# Patient Record
Sex: MALE
[De-identification: names, ages, dates, MRNs, and addresses within clinical notes are randomized per-mention and may not be internally consistent; named-entity substitution may affect disease eponyms.]

---

## 2021-09-20 ENCOUNTER — HOSPITAL ENCOUNTER (INPATIENT)
Dept: HOSPITAL 53 - M ED | Age: 21
LOS: 3 days | Discharge: HOME | DRG: 885 | End: 2021-09-23
Attending: PSYCHIATRY & NEUROLOGY | Admitting: PSYCHIATRY & NEUROLOGY
Payer: COMMERCIAL

## 2021-09-20 VITALS — SYSTOLIC BLOOD PRESSURE: 126 MMHG | DIASTOLIC BLOOD PRESSURE: 92 MMHG

## 2021-09-20 VITALS — WEIGHT: 197.31 LBS | BODY MASS INDEX: 29.9 KG/M2 | HEIGHT: 68 IN

## 2021-09-20 DIAGNOSIS — F32.1: Primary | ICD-10-CM

## 2021-09-20 DIAGNOSIS — Z56.6: ICD-10-CM

## 2021-09-20 DIAGNOSIS — Z20.822: ICD-10-CM

## 2021-09-20 DIAGNOSIS — R45.851: ICD-10-CM

## 2021-09-20 LAB
ALBUMIN SERPL BCG-MCNC: 3.8 GM/DL (ref 3.2–5.2)
ALT SERPL W P-5'-P-CCNC: 27 U/L (ref 12–78)
AMPHETAMINES UR QL SCN: NEGATIVE
APAP SERPL-MCNC: < 2 UG/ML (ref 10–30)
BARBITURATES UR QL SCN: NEGATIVE
BENZODIAZ UR QL SCN: NEGATIVE
BILIRUB CONJ SERPL-MCNC: < 0.1 MG/DL (ref 0–0.2)
BILIRUB SERPL-MCNC: 0.3 MG/DL (ref 0.2–1)
BUN SERPL-MCNC: 17 MG/DL (ref 7–18)
BZE UR QL SCN: NEGATIVE
CALCIUM SERPL-MCNC: 9 MG/DL (ref 8.5–10.1)
CANNABINOIDS UR QL SCN: NEGATIVE
CHLORIDE SERPL-SCNC: 105 MEQ/L (ref 98–107)
CO2 SERPL-SCNC: 30 MEQ/L (ref 21–32)
CREAT SERPL-MCNC: 0.99 MG/DL (ref 0.7–1.3)
ETHANOL SERPL-MCNC: < 0.003 % (ref 0–0.01)
GFR SERPL CREATININE-BSD FRML MDRD: > 60 ML/MIN/{1.73_M2} (ref 60–?)
GLUCOSE SERPL-MCNC: 99 MG/DL (ref 70–100)
HCT VFR BLD AUTO: 46.7 % (ref 42–52)
HGB BLD-MCNC: 15.7 G/DL (ref 13.5–17.5)
MCH RBC QN AUTO: 29 PG (ref 27–33)
MCHC RBC AUTO-ENTMCNC: 33.6 G/DL (ref 32–36.5)
MCV RBC AUTO: 86.2 FL (ref 80–96)
METHADONE UR QL SCN: NEGATIVE
OPIATES UR QL SCN: NEGATIVE
PCP UR QL SCN: NEGATIVE
PLATELET # BLD AUTO: 259 10^3/UL (ref 150–450)
POTASSIUM SERPL-SCNC: 4.5 MEQ/L (ref 3.5–5.1)
PROT SERPL-MCNC: 7.4 GM/DL (ref 6.4–8.2)
RBC # BLD AUTO: 5.42 10^6/UL (ref 4.3–6.1)
RSV RNA NPH QL NAA+PROBE: NEGATIVE
SALICYLATES SERPL-MCNC: < 1.7 MG/DL (ref 5–30)
SODIUM SERPL-SCNC: 140 MEQ/L (ref 136–145)
TSH SERPL DL<=0.005 MIU/L-ACNC: 1.89 UIU/ML (ref 0.36–3.74)
WBC # BLD AUTO: 4.8 10^3/UL (ref 4–10)

## 2021-09-20 SDOH — HEALTH STABILITY - MENTAL HEALTH: OTHER PHYSICAL AND MENTAL STRAIN RELATED TO WORK: Z56.6

## 2021-09-21 VITALS — SYSTOLIC BLOOD PRESSURE: 121 MMHG | DIASTOLIC BLOOD PRESSURE: 66 MMHG

## 2021-09-21 RX ADMIN — SERTRALINE HYDROCHLORIDE SCH MG: 50 TABLET ORAL at 09:00

## 2021-09-21 RX ADMIN — NICOTINE SCH PATCH: 21 PATCH, EXTENDED RELEASE TRANSDERMAL at 09:00

## 2021-09-21 NOTE — MHIPNPDOC
[facility] Progress Note


Progress Note


DATE OF SERVICE: 9/21/21





This writer decided to admit the patient to [facility] after the case was presented by

Emergency Department staff member. 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 

to overdose and symptoms of depression. Needed to be admitted for safety.





Vital Signs





Vital Signs








  Date Time  Temp Pulse Resp B/P (MAP) Pulse Ox O2 Delivery O2 Flow Rate FiO2


 


9/20/21 18:43 97.9 82 16 126/92 (103)    


 


9/20/21 07:13     98 Room Air  











Current Medications





Current Medications








 Medications


  (Trade)  Dose


 Ordered  Sig/Maximiliano


 Route


 PRN Reason  Start Time


 Stop Time Status Last Admin


Dose Admin


 


 Acetaminophen


  (Tylenol Tab)  650 mg  Q6HP  PRN


 PO


 HEADACHE or MILD DISCOMFORT  9/20/21 18:15


     





 


 Al Hydrox/Mg


 Hydrox/Simethicone


  (Mylanta)  30 ml  Q4HP  PRN


 PO


 HEARTBURN/INDIGESTION  9/20/21 18:15


     





 


 Home Med


  (Home Med List


 Complete!)    ASDIRECTED


 XX


   9/20/21 16:20


 9/20/21 16:18 DC  





 


 Magnesium


 Hydroxide


  (Milk Of


 Magnesia)  30 ml  DAILYPRN  PRN


 PO


 CONSTIPATION  9/20/21 18:15


     





 


 Nicotine


  (Nicoderm Cq


 21mg)  1 patch  DAILY


 TD


   9/21/21 09:00


     





 


 Sertraline HCl


  (Zoloft)  50 mg  DAILY


 PO


   9/21/21 09:00


     





 


 Trazodone HCl


  (Desyrel)  50 mg  QHSP  PRN


 PO


 INSOMNIA  9/20/21 18:15


     














Allergies


Coded Allergies:  


     No Known Allergies (Unverified , 9/20/21)











DIANE DOOLEY MD             Sep 21, 2021 15:02

## 2021-09-21 NOTE — MHHPEPDOC
General


Date Of Admission:  Sep 20, 2021


Legal Status:  9.39


Chief Complaint


"I haven't been the best mentally and emotionally."





History of Present Illness


HISTORY OF THE PRESENT ILLNESS: Patient interview - Patient is a 21 -year-old 

Single, Active Duty  , male, who reports that he has not been 

"mentally and emotionally well" since February.  States that the [employer] has been a

toxic environment for him.  Reports that over the past few months he has had 

negative thinking, doubting himself and feeling like he is in a job where he is 

not cared about.  He feels that there are limited or no opportunities to move 

out of the stressful job.  And many "other life stresses are getting to me."  

States that he had vague suicidal thoughts popping up earlier in the year but 

last weekend had a plan to take pain medications or overdose on pills. "I 

thought I could handle things, but not really, the environment is toxic and not 

healthy." 





PER ED REPORT:  





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 He is from [place] and 

states he has supportive family there. PT has not had to leave the country and 

he is scheduled to ETS 2023. PT states he wants to complete his contract but he 

began to question his purpose "the last few months of last year". PT turned to 

his [employer] and he found them to be supportive and he felt better for a few months. 

PT states his current work environment is an issue as the leadership has been 

making things more difficult then they need to be and that he is not the only 

person to feels this way. PT has difficulty discussing this as he does not wish 

to speak poorly in regards to his supervisor. PT feels that he has no purpose an

d he feels empty and alone. He first experienced SI at the beginning of the year

and has not told anyone to include his fianc of six months. He went to [place] for a few months and felt much better until her returned August 6th. PT 

feels he should be more established with his life as he had a good paying job at

a warehouse prior to enlisting and now he wonders if he "screwed everything up".

PT's mother struggles with anxiety and she has his two younger siblings to care 

for and is GF also has emotional issues. PT feels that had he made other choices

he would be able to take care of all his loved ones' issues. "I can't believe I 

let things get to this point" PT feels that he should be able to control his 

mental health and is very disappointed with himself. Today he and his coworkers 

were sitting around waiting for their next tasks and everyone was venting about 

stressors. PT states "I snapped" and he admits to being agitated and that he 

spoke of everything he has been struggling with and that "This place makes me 

suicidal". [employer] took PT aside to talk and he continued to be honest about his 

situation. He states he has never told anyone that he was suicidal and that he 

felt he could handle his situation but the thoughts are coming daily and vivid. 

PT states that he has been making jokes in regards to his emotional well-being 

for a very long time and that he wished someone would have realized that he was 

actually struggling "I dont know how to figure this out" meaning mental health.

He has scheduled with the [facility] but has not had time to know if treatment will be

beneficial for him. PT states he has planned out which pills to take and that he

would overdose in his room. PT does not wish to be admitted as it will be a 

reminder to him that he could not handle things on his own.





Psychiatric Review of Systems


Depression (2 or more weeks):  depressed mood (started in February), anhedonia, 

insomnia/hypersomnia (poor sleep, intermittent waking ), decreased energy, 

difficulty concentrating (poor focus), appetite changes, suicidal thoughts, 

other (feelings of hopeless ness)


Susan (4 or more days of):  denies


Psychosis:  paranoia (mild)


PTSD:  denies


Anxiety:  stressor related anxiety





Past Psychiatric History


Previous Psychiatric Diagnosis: possible ADHD as a child


Previous Psychiatric Admissions: This is the first hospitalization


Suicide Attempts: Had a plan to take pain killers last weekend


Psychiatric Follow-up: [facility]


Psychiatric medications: None





Past Medical History


Medical Problems


no contributory medical conditions


Head Injury:  No


Seizures:  No


Hospitalizations:  Yes


Surgeries:  Yes (Hernia at 6 years old)





Family Medical/Psychiatric HX


Medical Problems


Mom- HTN, Anxiety


Maternal Grandfather - Diabetes Insulin Dependent


Psychiatric Disorders:  Yes (mother has anxiety)


Addiction:  Yes (Maternal Uncle - Alcohol, Drugs and Gambling)


Suicide Attemps/Completions:  No





Addiction History


alcohol (occasional )





Social History


Childhood: Born [place] and describes his childhood "ok"  Did well in 

school.  Has many siblings -  Lived with his mother (2 brothers and two sister 

while growing up) but has sibling on his father side


Abuse/Trauma: None


Current Living Situation: Lives in the [facility] on post


Education: High School Diploma


Employment: Active Duty


Social Support: Girlfriend, Mother


Legal: None


Marital: Single





Stressors


1. My self - any little thing morphed and it is not anything that can be fixed


2. My work environment, it is toxic





Mental Status Examination


General Appearance:  well groomed, appears stated age, hospital scubs/clothing


Build:  overweight


Demeanor:  average


Eye Contact:  average


Activity:  average


Behavior:  cooperative


Speech:  clear, normal volume


Mood:  depressed, anxious


Affect:  constricted


Thought Process:  logical/linear


Thought Content (Delusions):  none reported


Thought Content (Other):  none reported


Thought Content (Aggressive):  none reported


Perception (Hallucinations):  none reported


Perception (Other):  none reported


Cognition (Impairment of):  none reported


Cognition(Intelligence Est.):  average


Oriented:  Awake, Alert, Oriented times three


Insight:  good


Judgment:  Good


Psychosis:  Denies





Diagnoses


Major Depressive Disorder, Single Episode, Moderate





A-FIB/CHADSVASC


A-FIB History


Current/History of A-Fib/PAF?:  No


Current PO Anticoag Therapy:  No





Assessment


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  

, male, who reports that he has not been "mentally and emotionally well"

since February.  States that the [employer] has been a toxic environment for him.  

Reports that over the past few months he has had negative thinking, doubting 

himself and feeling like he is in a job where he is not cared about.  Patient 

reports a history of catastrophizing any decisions especially ones where the 

outcomes is poor.  Educated on medications and states "I am just at a low point,

I don't agree on any medications."  Patient is agreeable to cognitive therapy in

individual and group therapy, will provider safe milieu and observe for any 

continued threats of self-harm.  Will discharged when stable.





Initial Treatment Plan


1. Patient was admitted on a [9.39] status.


2. Complete history was obtained.


3. With patients permission, family will be contacted and database will be 

expanded. 


4. Patients medication regimen will be reviewed and changed accordingly. 


5. Patient will be provided with protected environment. 


6. Patient will be treated with individual, group, and milieu therapies. 


7. Patient will receive supportive psych-education.


8. Discharge planning will commence immediately.


9. Outpatient follow-up treatment will be strongly recommended.


10. The initial treatment plan will focus initially on:


* Depression.


* Risk for suicide


* Negative thinking





ESTIMATED LENGTH OF STAY: 3-5 DAYS.





TIME SPENT COUNSELING AND COORDINATING INITIAL CARE: 60minutes.





Tobacco Cessation Screen


If Patient is a Smoker


Patient is not a smoker


Tobacco Cessation Tx Ordered?:  No r/t failed trials


N/A-No Antipsychotics





Vital Signs





Vital Signs








  Date Time  Temp Pulse Resp B/P (MAP) Pulse Ox O2 Delivery O2 Flow Rate FiO2


 


9/20/21 18:43 97.9 82 16 126/92 (103)    


 


9/20/21 07:13     98 Room Air  











Laboratory Data


[facility]


Laboratory Tests 2


9/20/21 14:44: 


Coronavirus (COVID-19)(PCR) NEGATIVE, Influenza Type A (RT-PCR) NEGATIVE, 

Influenza Type B (RT-PCR) NEGATIVE, Respiratory Syncytial Virus (PCR) NEGATIVE





Medications


No Active Prescriptions or Reported Meds





Allergies


Coded Allergies:  


     No Known Allergies (Unverified , 9/20/21)











MARY LEE NP              Sep 21, 2021 11:30

## 2021-09-22 VITALS — DIASTOLIC BLOOD PRESSURE: 60 MMHG | SYSTOLIC BLOOD PRESSURE: 124 MMHG

## 2021-09-22 VITALS — DIASTOLIC BLOOD PRESSURE: 59 MMHG | SYSTOLIC BLOOD PRESSURE: 119 MMHG

## 2021-09-22 RX ADMIN — NICOTINE SCH PATCH: 21 PATCH, EXTENDED RELEASE TRANSDERMAL at 08:33

## 2021-09-22 RX ADMIN — SERTRALINE HYDROCHLORIDE SCH MG: 50 TABLET ORAL at 08:33

## 2021-09-22 NOTE — MHIPNPDOC
[facility] Progress Note


Progress Note


DATE OF SERVICE: 9/22/21





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 

 , male, who reports that he has not been "mentally and 

emotionally well" since February.  States that the [employer] has been a toxic 

environment for him.  Reports that over the past few months he has had negative 

thinking, doubting himself and feeling like he is in a job where he is not cared

about.  He feels that there are limited or no opportunities to move out of the 

stressful job.  And many "other life stresses are getting to me."  States that 

he had vague suicidal thoughts popping up earlier in the year but last weekend 

had a plan to take pain medications or overdose on pills. "I thought I could 

handle things, but not really, the environment is toxic and not healthy." 





PER ED REPORT:  





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 He is from [place] and 

states he has supportive family there. PT has not had to leave the country and 

he is scheduled to ETS 2023. PT states he wants to complete his contract but he 

began to question his purpose "the last few months of last year". PT turned to 

his [employer] and he found them to be supportive and he felt better for a few months. 

PT states his current work environment is an issue as the leadership has been 

making things more difficult then they need to be and that he is not the only 

person to feels this way. PT has difficulty discussing this as he does not wish 

to speak poorly in regards to his supervisor. PT feels that he has no purpose 

and he feels empty and alone. He first experienced SI at the beginning of the 

year and has not told anyone to include his fianc of six months. He went to 

[place] for a few months and felt much better until her returned August 6th. 

PT feels he should be more established with his life as he had a good paying job

at a warehouse prior to enlisting and now he wonders if he "screwed everything 

up". PT's mother struggles with anxiety and she has his two younger siblings to 

care for and is GF also has emotional issues. PT feels that had he made other 

choices he would be able to take care of all his loved ones' issues. "I can't 

believe I let things get to this point" PT feels that he should be able to 

control his mental health and is very disappointed with himself. Today he and 

his coworkers were sitting around waiting for their next tasks and everyone was 

venting about stressors. PT states "I snapped" and he admits to being agitated 

and that he spoke of everything he has been struggling with and that "This place

makes me suicidal". [employer] took PT aside to talk and he continued to be honest 

about his situation. He states he has never told anyone that he was suicidal and

that he felt he could handle his situation but the thoughts are coming daily and

vivid. PT states that he has been making jokes in regards to his emotional well-

being for a very long time and that he wished someone would have realized that 

he was actually struggling "I dont know how to figure this out" meaning mental 

health. He has scheduled with the [facility] but has not had time to know if treatment

will be beneficial for him. PT states he has planned out which pills to take and

that he would overdose in his room. PT does not wish to be admitted as it will 

be a reminder to him that he could not handle things on his own.





VITAL SIGNS: See below.





NEW TEST RESULTS: None





CURRENT MEDICATIONS: See below.





MENTAL STATUS EXAMINATION:


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 

reports that he has not been "mentally and emotionally well" 


General Appearance: Hygiene and grooming fair mildly disheveled, appears stated 

age, hospital scrubs/clothing


Build:  overweight


Demeanor:  average


Eye Contact:  average


Activity:  average


Behavior:  cooperative


Speech:  clear, normal volume


Mood:  depressed, anxious


Affect:  constricted


Thought Process:  logical/linear


Thought Content (Delusions):  none reported


Thought Content (Other):  none reported


Thought Content (Aggressive):  none reported


Perception (Hallucinations):  none reported


Perception (Other):  none reported


Cognition (Impairment of):  none reported


Cognition(Intelligence Est.):  average


Oriented:  Awake, Alert, Oriented times three


Insight:  good


Judgment:  Good


Psychosis:  Denies





DIAGNOSES:


Major Depressive Disorder, Single Episode, Moderate





ASSESSMENT: Patient found sleeping willing to be interviewed this morning.  

States that he is anxious this morning but mostly because he does not want to be

on the unit anymore.  Reports that his depression is decreased nominally.  

Reports his mood is "better".  Reports that much of his problems will not be 

taking care of until he is no longer in the [employer].  He refused to elaborate on 

this but we did have a meaningful conversation about some of his stressors.  He 

reports that he understands that much of his feeling about toxic environments is

his ability to view it as a negative thinking.  He also talks about his gir

lfriend having family issues, we talked about this being a temporary issue and 

that at some point it will improve.  Patient continues to worry about his mother

but appears to minimize this as a stressor.  Patient is seen in the milieu 

cooperative and pleasant.  He denies any suicidal thinking in the interview 

today and requesting to be discharged tomorrow.





MANAGEMENT PLAN: Continue all as needed medication and continue supportive 

therapies.  Patient does not want to have any anti depression medication.  Дмитрий gomez to be discharged tomorrow





TIME SPENT: 25 minutes.





Vital Signs





Vital Signs








  Date Time  Temp Pulse Resp B/P (MAP) Pulse Ox O2 Delivery O2 Flow Rate FiO2


 


9/22/21 06:45 97.0 78 16 119/59 (79) 96 Room Air  











Current Medications





Current Medications








 Medications


  (Trade)  Dose


 Ordered  Sig/Maximiliano


 Route


 PRN Reason  Start Time


 Stop Time Status Last Admin


Dose Admin


 


 Acetaminophen


  (Tylenol Tab)  650 mg  Q6HP  PRN


 PO


 HEADACHE or MILD DISCOMFORT  9/20/21 18:15


     





 


 Al Hydrox/Mg


 Hydrox/Simethicone


  (Mylanta)  30 ml  Q4HP  PRN


 PO


 HEARTBURN/INDIGESTION  9/20/21 18:15


     





 


 Home Med


  (Home Med List


 Complete!)    ASDIRECTED


 XX


   9/20/21 16:20


 9/20/21 16:18 DC  





 


 Magnesium


 Hydroxide


  (Milk Of


 Magnesia)  30 ml  DAILYPRN  PRN


 PO


 CONSTIPATION  9/20/21 18:15


     





 


 Nicotine


  (Nicoderm Cq


 21mg)  1 patch  DAILY


 TD


   9/21/21 09:00


     





 


 Sertraline HCl


  (Zoloft)  50 mg  DAILY


 PO


   9/21/21 09:00


     





 


 Trazodone HCl


  (Desyrel)  50 mg  QHSP  PRN


 PO


 INSOMNIA  9/20/21 18:15


    9/21/21 20:47














Allergies


Coded Allergies:  


     No Known Allergies (Unverified , 9/20/21)











MARY LEE NP              Sep 22, 2021 12:43

## 2021-09-23 VITALS — DIASTOLIC BLOOD PRESSURE: 56 MMHG | SYSTOLIC BLOOD PRESSURE: 119 MMHG

## 2021-09-23 RX ADMIN — SERTRALINE HYDROCHLORIDE SCH MG: 50 TABLET ORAL at 09:00

## 2021-09-23 RX ADMIN — NICOTINE SCH PATCH: 21 PATCH, EXTENDED RELEASE TRANSDERMAL at 09:00

## 2021-09-23 NOTE — MHDSPDOC
[facility] Discharge Summary


Discharge Summary


DATE OF ADMISSION: Sep 20, 2021 at 18:11 


DATE OF DISCHARGE: September 23, 2021 1130





DISCHARGE DIAGNOSES:


Major Depressive Disorder, Single Episode, Moderate





REASON FOR ADMISSION: Patient interview - Patient is a 21 -year-old Single, 

Active Duty  , male, who reports that he has not been "mentally 

and emotionally well" since February.  States that the [employer] has been a toxic 

environment for him.  Reports that over the past few months he has had negative 

thinking, doubting himself and feeling like he is in a job where he is not cared

about.  He feels that there are limited or no opportunities to move out of the 

stressful job.  And many "other life stresses are getting to me."  States that 

he had vague suicidal thoughts popping up earlier in the year but last weekend 

had a plan to take pain medications or overdose on pills. "I thought I could 

handle things, but not really, the environment is toxic and not healthy." 





PER ED REPORT:  





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 He is from [place] and 

states he has supportive family there. PT has not had to leave the country and 

he is scheduled to [employer]TS 2023. PT states he wants to complete his contract but he 

began to question his purpose "the last few months of last year". PT turned to 

his [employer] and he found them to be supportive and he felt better for a few months. 

PT states his current work environment is an issue as the leadership has been 

making things more difficult then they need to be and that he is not the only 

person to feels this way. PT has difficulty discussing this as he does not wish 

to speak poorly in regards to his supervisor. PT feels that he has no purpose 

and he feels empty and alone. He first experienced SI at the beginning of the 

year and has not told anyone to include his fianc of six months. He went to 

[place] for a few months and felt much better until her returned August 6th. 

PT feels he should be more established with his life as he had a good paying job

at a warehouse prior to enlisting and now he wonders if he "screwed everything 

up". PT's mother struggles with anxiety and she has his two younger siblings to 

care for and is GF also has emotional issues. PT feels that had he made other 

choices he would be able to take care of all his loved ones' issues. "I can't 

believe I let things get to this point" PT feels that he should be able to 

control his mental health and is very disappointed with himself. Today he and 

his coworkers were sitting around waiting for their next tasks and everyone was 

venting about stressors. PT states "I snapped" and he admits to being agitated 

and that he spoke of everything he has been struggling with and that "This place

makes me suicidal". [employer] took PT aside to talk and he continued to be honest 

about his situation. He states he has never told anyone that he was suicidal and

that he felt he could handle his situation but the thoughts are coming daily and

vivid. PT states that he has been making jokes in regards to his emotional well-

being for a very long time and that he wished someone would have realized that 

he was actually struggling "I dont know how to figure this out" meaning mental 

health. He has scheduled with the [facility] but has not had time to know if treatment

will be beneficial for him. PT states he has planned out which pills to take and

that he would overdose in his room. PT does not wish to be admitted as it will 

be a reminder to him that he could not handle things on his own.





VITAL SIGNS: See below.


CONSULTANTS INVOLVED: See Medical H + P by Hospitalist


TREATMENT AND PROGRESS ON THE UNIT:  Patient was admitted to the [facility] on a 9.39 

legal status was afforded the following treatment modalities:


1) Individual Therapy


2) Group Therapy


3) Medication Management


4) Milieu Therapy


5) Safe Environment


HOSPITAL COURSE:  Patient was admitted to [facility] on a 9.39 legal status.  Pt was 

offered medications but he declined.  Mood, anxiety, and intrusive thoughts 

improved with hospitalization.  Pt attended groups daily during stay.  Pts 

symptoms improved with treatment.  Patient had no ideations gestures or attempts

while in the hospital.  He reported anxiety and depression that had decreased 

while hospitalized.  on day of discharge pt. denied depression, anxiety, 

insomnia, SI/HI, hallucinations, delusions.  Pt was discharged home with follow-

up with [facility].  Pt felt safe for discharge.





DISCHARGE ASSESSMENT: In today's interview, patient is alert and oriented, pt.s

dress is appropriate.  Hygiene and grooming is well-kempt.  Smiles on approach 

and is pleasant and engaged in the interview.  Denies depression and anxiety.  

Denies suicidal and homicidal ideation, planning or intent.  Denies and is not 

observed with tai, psychotic symptoms of delusions, bizarre thinking, 

obsessions, paranoia, ruminations illogical thoughts, flight of ideas or having 

poor insight and judgement.   At discharge patient has normal mentation, 

declines further hospitalization on a voluntary status and meets criteria for 

discharge today.  Today and review of his medications that he had declined, he 

stated that he was being encouraged to take an antidepressant and that he was 

willing to take this upon his discharge.  Reviewed with the patient that he had 

not taken the medication while he was in the hospital and that he could 

experience some side effects.  Patient states that he would like Zoloft 

prescribed and would consider taking it along with seeing a psychiatrist at [facility].  Reinforced with the patient, that prescribing him the 

Zoloft without assessing for any adverse effects is highly discouraged by this 

provider.  But patient insists that he can be followed up with for [facility].  He states that he is going to look for new hobby, something 

that will bring him enjoyment, and that he plans on visiting his fiance this 

weekend.  Patient appears somewhat nervous about returning to post stating that 

he does not want his hospitalization to affect him negatively.  And he worries 

that his diagnosis will filter up to chain of command and further feed into 

negativity.  








MENTAL STATUS EXAMINATION ON DISCHARGE: 


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  

, male, who reports that he has not been "mentally and emotionally well"

since February. 


Speech: Is fluid, conversant, normal rate, tone and volume


Language skills are intact


Thought processes including: linear and goal oriented


Thought content: denies depression and anxiety. Denies suicidal/homicidal 

ideation, planning or intent. Abstract reasoning, and computation: fair  


Description of associations: denies, none observed


Description of abnormal or psychotic thoughts: denies, none observed.


Judgment: fair


Insight: fair


Orientation: alert and oriented to person, place, time and situation


Recent and remote memory: intact


Attention span and concentration: good


Language: expansive


Fund of knowledge: average


Mood: Euthymic Mood Affect: reactive





Suicide Risk Assessment: 


1)   Does the patient wish to be dead?  No


2)   Since your admission, have you had any actual thought of killing yourself? 

No


3)   Since your admission, have you been thinking about how you might do this?  

No


4)   Since your admission, have you had these thoughts and had some intention of

acting on them?  No


5)   Since your admission, have you started to work out or worked out the detail

s of how to kill yourself? No 


5A) Do you intent to carry out this plan? No and NA


6)   Have you ever done anything, started anything, or prepared to do anything 

with any intent to die? No


6A) How long since your admission did you do any of these? NA


MEDICATIONS ON DISCHARGE:


See Medication Reconciliation


PLAN/FOLLOWUP ARRANGEMENTS: Patient is discharged to change of command, will be 

returning to the [facility].  He is following up with [facility]





The amount of time spent in the coordination of care for this patient was 

approximately 25 minutes.





ETOH/Disorder Med Rx


ETOH/DRUG DISORDER RX:  N/A





Vital Signs/I&Os





Vital Signs








  Date Time  Temp Pulse Resp B/P (MAP) Pulse Ox O2 Delivery O2 Flow Rate FiO2


 


9/23/21 06:41 97.7 63 16 119/56 (77) 100 Room Air  











Medications


Scheduled


Sertraline HCl (Sertraline HCl) 50 Mg Tablet, 50 MG PO DAILY for Mood, #7





Allergies


Coded Allergies:  


     No Known Allergies (Unverified , 9/20/21)











MARY LEE NP              Sep 23, 2021 11:42